# Patient Record
Sex: FEMALE | Race: WHITE | NOT HISPANIC OR LATINO | Employment: STUDENT | URBAN - METROPOLITAN AREA
[De-identification: names, ages, dates, MRNs, and addresses within clinical notes are randomized per-mention and may not be internally consistent; named-entity substitution may affect disease eponyms.]

---
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
## 2024-02-26 ENCOUNTER — OFFICE VISIT (OUTPATIENT)
Dept: PEDIATRICS | Facility: CLINIC | Age: 19
End: 2024-02-26
Payer: COMMERCIAL

## 2024-02-26 VITALS
WEIGHT: 166.44 LBS | DIASTOLIC BLOOD PRESSURE: 80 MMHG | OXYGEN SATURATION: 98 % | HEART RATE: 69 BPM | BODY MASS INDEX: 29.49 KG/M2 | RESPIRATION RATE: 16 BRPM | HEIGHT: 63 IN | SYSTOLIC BLOOD PRESSURE: 118 MMHG

## 2024-02-26 DIAGNOSIS — J45.20 INTERMITTENT ASTHMA, UNCOMPLICATED: ICD-10-CM

## 2024-02-26 DIAGNOSIS — Z00.129 ENCOUNTER FOR ROUTINE CHILD HEALTH EXAMINATION W/O ABNORMAL FINDINGS: Primary | ICD-10-CM

## 2024-02-26 DIAGNOSIS — L70.0 ACNE VULGARIS: ICD-10-CM

## 2024-02-26 PROCEDURE — 96127 BRIEF EMOTIONAL/BEHAV ASSMT: CPT | Performed by: STUDENT IN AN ORGANIZED HEALTH CARE EDUCATION/TRAINING PROGRAM

## 2024-02-26 PROCEDURE — 90686 IIV4 VACC NO PRSV 0.5 ML IM: CPT | Performed by: STUDENT IN AN ORGANIZED HEALTH CARE EDUCATION/TRAINING PROGRAM

## 2024-02-26 PROCEDURE — 99395 PREV VISIT EST AGE 18-39: CPT | Mod: 25 | Performed by: STUDENT IN AN ORGANIZED HEALTH CARE EDUCATION/TRAINING PROGRAM

## 2024-02-26 PROCEDURE — 99213 OFFICE O/P EST LOW 20 MIN: CPT | Mod: 25 | Performed by: STUDENT IN AN ORGANIZED HEALTH CARE EDUCATION/TRAINING PROGRAM

## 2024-02-26 PROCEDURE — 90471 IMMUNIZATION ADMIN: CPT | Performed by: STUDENT IN AN ORGANIZED HEALTH CARE EDUCATION/TRAINING PROGRAM

## 2024-02-26 RX ORDER — TRETINOIN 0.05 G/100G
GEL TOPICAL AT BEDTIME
Qty: 45 G | Refills: 3 | Status: SHIPPED | OUTPATIENT
Start: 2024-02-26

## 2024-02-26 RX ORDER — ALBUTEROL SULFATE 90 UG/1
2 AEROSOL, METERED RESPIRATORY (INHALATION) EVERY 4 HOURS PRN
Qty: 8.5 G | Refills: 3 | Status: SHIPPED | OUTPATIENT
Start: 2024-02-26

## 2024-02-26 SDOH — HEALTH STABILITY: PHYSICAL HEALTH: ON AVERAGE, HOW MANY DAYS PER WEEK DO YOU ENGAGE IN MODERATE TO STRENUOUS EXERCISE (LIKE A BRISK WALK)?: 4 DAYS

## 2024-02-26 SDOH — HEALTH STABILITY: PHYSICAL HEALTH: ON AVERAGE, HOW MANY MINUTES DO YOU ENGAGE IN EXERCISE AT THIS LEVEL?: 50 MIN

## 2024-02-26 SDOH — HEALTH STABILITY: PHYSICAL HEALTH: ON AVERAGE, HOW MANY MINUTES DO YOU ENGAGE IN EXERCISE AT THIS LEVEL?: 40 MIN

## 2024-02-26 SDOH — HEALTH STABILITY: PHYSICAL HEALTH: ON AVERAGE, HOW MANY DAYS PER WEEK DO YOU ENGAGE IN MODERATE TO STRENUOUS EXERCISE (LIKE A BRISK WALK)?: 3 DAYS

## 2024-02-26 ASSESSMENT — SOCIAL DETERMINANTS OF HEALTH (SDOH)
HOW OFTEN DO YOU GET TOGETHER WITH FRIENDS OR RELATIVES?: TWICE A WEEK
HOW OFTEN DO YOU GET TOGETHER WITH FRIENDS OR RELATIVES?: TWICE A WEEK

## 2024-02-26 NOTE — PATIENT INSTRUCTIONS
Patient Education    [facility]- PATIENT  18 THROUGH 21 YEAR VISITS  Here are some suggestions from [employer] experts that may be of value to your family.     HOW YOU ARE DOING  Enjoy spending time with your family.  Find activities you are really interested in, such as sports, theater, or volunteering.  Try to be responsible for your schoolwork or work obligations.  Always talk through problems and never use violence.  If you get angry with someone, try to walk away.  If you feel unsafe in your home or have been hurt by someone, let us know. Hotlines and community agencies can also provide confidential help.  Talk with us if you are worried about your living or food situation. Community agencies and programs such as SNAP can help.  Don t smoke, vape, or use drugs. Avoid people who do when you can. Talk with us if you are worried about alcohol or drug use in your family.    YOUR DAILY LIFE  Visit the dentist at least twice a year.  Brush your teeth at least twice a day and floss once a day.  Be a healthy eater.  Have vegetables, fruits, lean protein, and whole grains at meals and snacks.  Limit fatty, sugary, salty foods that are low in nutrients, such as candy, chips, and ice cream.  Eat when you re hungry. Stop when you feel satisfied.  Eat breakfast.  Drink plenty of water.  Make sure to get enough calcium every day.  Have 3 or more servings of low-fat (1%) or fat-free milk and other low-fat dairy products, such as yogurt and cheese.  Women: Make sure to eat foods rich in folate, such as fortified grains and dark- green leafy vegetables.  Aim for at least 1 hour of physical activity every day.  Wear safety equipment when you play sports.  Get enough sleep.  Talk with us about managing your health care and insurance as an adult.    YOUR FEELINGS  Most people have ups and downs. If you are feeling sad, depressed, nervous, irritable, hopeless, or angry, let us know or reach out to another health  care professional.  Figure out healthy ways to deal with stress.  Try your best to solve problems and make decisions on your own.  Sexuality is an important part of your life. If you have any questions or concerns, we are here for you.    HEALTHY BEHAVIOR CHOICES  Avoid using drugs, alcohol, tobacco, steroids, and diet pills. Support friends who choose not to use.  If you use drugs or alcohol, let us know or talk with another trusted adult about it. We can help you with quitting or cutting down on your use.  Make healthy decisions about your sexual behavior.  If you are sexually active, always practice safe sex. Always use birth control along with a condom to prevent pregnancy and sexually transmitted infections.  All sexual activity should be something you want. No one should ever force or try to convince you.  Protect your hearing at work, home, and concerts. Keep your earbud volume down.    STAYING SAFE  Always be a safe and cautious .  Insist that everyone use a lap and shoulder seat belt.  Limit the number of friends in the car and avoid driving at night.  Avoid distractions. Never text or talk on the phone while you drive.  Do not ride in a vehicle with someone who has been using drugs or alcohol.  If you feel unsafe driving or riding with someone, call someone you trust to drive you.  Wear helmets and protective gear while playing sports. Wear a helmet when riding a bike, a motorcycle, or an ATV or when skiing or skateboarding.  Always use sunscreen and a hat when you re outside.  Fighting and carrying weapons can be dangerous. Talk with your parents, teachers, or doctor about how to avoid these situations.        Consistent with Bright Futures: Guidelines for Health Supervision of Infants, Children, and Adolescents, 4th Edition  For more information, go to https://brightfutures.aap.org.

## 2024-02-26 NOTE — PROGRESS NOTES
Preventive Care Visit  [facility]  Cammie TIJERINA MD, Pediatrics  Feb 26, 2024    Assessment & Plan   18 year old, here for preventive care.    Encounter for routine child health examination w/o abnormal findings    - BEHAVIORAL/EMOTIONAL ASSESSMENT (40902)    Intermittent asthma, uncomplicated    - albuterol (PROAIR HFA/PROVENTIL HFA/VENTOLIN HFA) 108 (90 Base) MCG/ACT inhaler  Dispense: 8.5 g; Refill: 3    Acne vulgaris    - tretinoin (RETIN-A) 0.05 % external gel  Dispense: 45 g; Refill: 3    Asthma is well controlled. Uses albuterol inhaler prior to more physically intense exercise to prevent symptoms during activity.  Refills provided today.   Also refilled tretioin 0.05% for acne- uses intermittently and in need of refill. Acne overall in good control.       Growth      Normal height and weight  Pediatric Healthy Lifestyle Action Plan         Exercise and nutrition counseling performed    Immunizations   Appropriate vaccinations were ordered.  MenB Vaccine not indicated.    Anticipatory Guidance    Reviewed age appropriate anticipatory guidance.           Referrals/Ongoing Specialty Care  None  Verbal Dental Referral: Patient has established dental home    Dyslipidemia Follow Up:  Discussed nutrition and lipid testing obtained 12/23 .   Will obtain at next annual well visit next year      Subjective   Iadee is presenting for the following:  Well Child (Menstrual cycle)    Had period in December , January and not in February yet.   Previous to December had 3 months between periods.     December and January periods were darker blood and more heavy flow- occurred for 7 days.   Had dance show this past week, with increased rehearsals the week prior.     Physical activities- dance; some yoga and weights. Weight stable     Has stop drinking coffee, rarely energy drinks.   Sleep - gets about 8-9 hours of sleep.         2/26/2024     1:53 PM   Additional Questions   Accompanied by mother            2/26/2024   Social   Lives with Family   Recent potential stressors (!) SCHOOL PROBLEMS    (!) DEATH OF A LOVED ONE    (!) ADJUSTMENT TO CHANGE   History of trauma No   Family Hx of mental health challenges No   Lack of transportation has limited access to appts/meds No    No   Do you have housing?  Yes    Yes   Are you worried about losing your housing? No    No         2/26/2024     1:51 PM   Health Risks/Safety   Do you always wear a seat belt? Yes   Helmet use? Yes            2/26/2024     1:51 PM   TB Screening: Consider immunosuppression as a risk factor for TB   Recent TB infection or positive TB test in family/close contacts No   Recent travel outside [place] (you/family/close contacts) No   Recent residence in high-risk group setting (correctional facility/health care facility/homeless shelter/refugee camp) No          2/26/2024     1:51 PM   Dyslipidemia   FH: premature cardiovascular disease No, these conditions are not present in the patient's biologic parents or grandparents   FH: hyperlipidemia (!) YES   Personal risk factors for heart disease NO diabetes, high blood pressure, obesity, smokes cigarettes, kidney problems, heart or kidney transplant, history of Kawasaki disease with an aneurysm, lupus, rheumatoid arthritis, or HIV     Recent Labs   Lab Test 12/14/23  1526   CHOL 170*   HDL 48   LDL 94   TRIG 140*           2/26/2024     1:51 PM   Diet   What type of water? Tap    (!) BOTTLED         2/26/2024   Diet   Do you have questions about your eating?  No   Do you have questions about your weight?  No   What do you regularly drink? Water    (!) MILK ALTERNATIVE (E.G. SOY, ALMOND, RIPPLE)    (!) COFFEE OR TEA   What type of water? Tap    (!) BOTTLED   Do you think you eat healthy foods? Yes   At least 3 servings of food or beverages that have calcium each day? Yes   How would you describe your diet?  (!) GLUTEN-FREE/REDUCED    (!) LOW FAT/CHOLESTEROL    (!) HIGH PROTEIN    (!) LOW  "CARBOHYDRATE   In past 12 months, concerned food might run out No    No   In past 12 months, food has run out/couldn't afford more No    No         2/26/2024   Activity   Days per week of moderate/strenuous exercise 4 days    3 days   On average, how many minutes do you engage in exercise at this level? 50 min    40 min   What do you do for exercise? pilates,walking,dance,light weight lifting   What activities are you involved with? [employer],dance community         2/26/2024     1:51 PM   Media Use   Hours per day of screen time (for entertainment) 3 to 5         2/26/2024     1:51 PM   Sleep   Do you have any trouble with sleep? No         2/26/2024     1:51 PM   School   Are you in school? Yes   What school do you attend?  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        2/26/2024     1:51 PM   AMB C MENSES SECTION   What are your periods like?  (!) IRREGULAR          Objective     Exam  /80 (BP Location: Left arm, Patient Position: Sitting, Cuff Size: Adult Regular)   Pulse 69   Resp 16   Ht 5' 3.25\" (1.607 m)   Wt 166 lb 7 oz (75.5 kg)   LMP 01/16/2024 (Exact Date)   SpO2 98%   BMI 29.25 kg/m    34 %ile (Z= -0.41) based on CDC (Girls, 2-20 Years) Stature-for-age data based on Stature recorded on 2/26/2024.  91 %ile (Z= 1.33) based on CDC (Girls, 2-20 Years) weight-for-age data using vitals from 2/26/2024.  93 %ile (Z= 1.45) based on CDC (Girls, " 2-20 Years) BMI-for-age based on BMI available as of 2/26/2024.  Blood pressure %terri are not available for patients who are 18 years or older.    Vision Screen  Vision Screen Details  Reason Vision Screen Not Completed: Patient had exam in last 12 months    Hearing Screen  Hearing Screen Not Completed  Reason Hearing Screen was not completed: Parent declined - No concerns      Physical Exam  GENERAL: Active, alert, in no acute distress.  SKIN: Clear. No significant rash, abnormal pigmentation or lesions  HEAD: Normocephalic  EYES: Pupils equal, round, reactive, Extraocular muscles intact. Normal conjunctivae.  EARS: Normal canals. Tympanic membranes are normal; gray and translucent.  NOSE: Normal without discharge.  MOUTH/THROAT: Clear. No oral lesions. Teeth without obvious abnormalities.  NECK: Supple, no masses.  No thyromegaly.  LYMPH NODES: No adenopathy  LUNGS: Clear. No rales, rhonchi, wheezing or retractions  HEART: Regular rhythm. Normal S1/S2. No murmurs. Normal pulses.  ABDOMEN: Soft, non-tender, not distended, no masses or hepatosplenomegaly. Bowel sounds normal.   NEUROLOGIC: No focal findings. Cranial nerves grossly intact: DTR's normal. Normal gait, strength and tone  EXTREMITIES: Full range of motion, no deformities  : deferred        Signed Electronically by: Cammie TIJERINA MD

## 2025-01-27 ENCOUNTER — PATIENT OUTREACH (OUTPATIENT)
Dept: CARE COORDINATION | Facility: CLINIC | Age: 20
End: 2025-01-27
Payer: COMMERCIAL

## 2025-02-10 ENCOUNTER — PATIENT OUTREACH (OUTPATIENT)
Dept: CARE COORDINATION | Facility: CLINIC | Age: 20
End: 2025-02-10
Payer: COMMERCIAL

## 2025-03-29 ENCOUNTER — HEALTH MAINTENANCE LETTER (OUTPATIENT)
Age: 20
End: 2025-03-29

## 2025-08-25 ENCOUNTER — PATIENT OUTREACH (OUTPATIENT)
Dept: CARE COORDINATION | Facility: CLINIC | Age: 20
End: 2025-08-25
Payer: COMMERCIAL

## 2025-09-01 SDOH — HEALTH STABILITY: PHYSICAL HEALTH: ON AVERAGE, HOW MANY MINUTES DO YOU ENGAGE IN EXERCISE AT THIS LEVEL?: 40 MIN

## 2025-09-01 SDOH — HEALTH STABILITY: PHYSICAL HEALTH: ON AVERAGE, HOW MANY DAYS PER WEEK DO YOU ENGAGE IN MODERATE TO STRENUOUS EXERCISE (LIKE A BRISK WALK)?: 4 DAYS

## 2025-09-01 ASSESSMENT — ASTHMA QUESTIONNAIRES
QUESTION_5 LAST FOUR WEEKS HOW WOULD YOU RATE YOUR ASTHMA CONTROL: WELL CONTROLLED
QUESTION_2 LAST FOUR WEEKS HOW OFTEN HAVE YOU HAD SHORTNESS OF BREATH: NOT AT ALL
QUESTION_4 LAST FOUR WEEKS HOW OFTEN HAVE YOU USED YOUR RESCUE INHALER OR NEBULIZER MEDICATION (SUCH AS ALBUTEROL): ONCE A WEEK OR LESS
ACT_TOTALSCORE: 22
QUESTION_3 LAST FOUR WEEKS HOW OFTEN DID YOUR ASTHMA SYMPTOMS (WHEEZING, COUGHING, SHORTNESS OF BREATH, CHEST TIGHTNESS OR PAIN) WAKE YOU UP AT NIGHT OR EARLIER THAN USUAL IN THE MORNING: ONCE OR TWICE
QUESTION_1 LAST FOUR WEEKS HOW MUCH OF THE TIME DID YOUR ASTHMA KEEP YOU FROM GETTING AS MUCH DONE AT WORK, SCHOOL OR AT HOME: NONE OF THE TIME

## 2025-09-02 ENCOUNTER — OFFICE VISIT (OUTPATIENT)
Dept: PEDIATRICS | Facility: CLINIC | Age: 20
End: 2025-09-02
Payer: COMMERCIAL

## 2025-09-02 VITALS
BODY MASS INDEX: 31.71 KG/M2 | HEART RATE: 114 BPM | OXYGEN SATURATION: 100 % | TEMPERATURE: 97.9 F | RESPIRATION RATE: 18 BRPM | WEIGHT: 179 LBS | HEIGHT: 63 IN | SYSTOLIC BLOOD PRESSURE: 120 MMHG | DIASTOLIC BLOOD PRESSURE: 78 MMHG

## 2025-09-02 DIAGNOSIS — J45.20 INTERMITTENT ASTHMA, UNCOMPLICATED: ICD-10-CM

## 2025-09-02 DIAGNOSIS — Z00.00 ROUTINE GENERAL MEDICAL EXAMINATION AT A HEALTH CARE FACILITY: Primary | ICD-10-CM

## 2025-09-02 PROCEDURE — 99213 OFFICE O/P EST LOW 20 MIN: CPT | Mod: 25 | Performed by: STUDENT IN AN ORGANIZED HEALTH CARE EDUCATION/TRAINING PROGRAM

## 2025-09-02 PROCEDURE — 92551 PURE TONE HEARING TEST AIR: CPT | Performed by: STUDENT IN AN ORGANIZED HEALTH CARE EDUCATION/TRAINING PROGRAM

## 2025-09-02 PROCEDURE — 3078F DIAST BP <80 MM HG: CPT | Performed by: STUDENT IN AN ORGANIZED HEALTH CARE EDUCATION/TRAINING PROGRAM

## 2025-09-02 PROCEDURE — 90471 IMMUNIZATION ADMIN: CPT | Performed by: STUDENT IN AN ORGANIZED HEALTH CARE EDUCATION/TRAINING PROGRAM

## 2025-09-02 PROCEDURE — 99395 PREV VISIT EST AGE 18-39: CPT | Mod: 25 | Performed by: STUDENT IN AN ORGANIZED HEALTH CARE EDUCATION/TRAINING PROGRAM

## 2025-09-02 PROCEDURE — 3074F SYST BP LT 130 MM HG: CPT | Performed by: STUDENT IN AN ORGANIZED HEALTH CARE EDUCATION/TRAINING PROGRAM

## 2025-09-02 PROCEDURE — 90656 IIV3 VACC NO PRSV 0.5 ML IM: CPT | Performed by: STUDENT IN AN ORGANIZED HEALTH CARE EDUCATION/TRAINING PROGRAM

## 2025-09-02 RX ORDER — ALBUTEROL SULFATE 90 UG/1
2 INHALANT RESPIRATORY (INHALATION) EVERY 4 HOURS PRN
Qty: 8.5 G | Refills: 5 | Status: SHIPPED | OUTPATIENT
Start: 2025-09-02